# Patient Record
Sex: FEMALE | Race: WHITE | NOT HISPANIC OR LATINO | Employment: FULL TIME | URBAN - METROPOLITAN AREA
[De-identification: names, ages, dates, MRNs, and addresses within clinical notes are randomized per-mention and may not be internally consistent; named-entity substitution may affect disease eponyms.]

---

## 2017-07-05 ENCOUNTER — APPOINTMENT (EMERGENCY)
Dept: RADIOLOGY | Facility: HOSPITAL | Age: 57
End: 2017-07-05
Payer: COMMERCIAL

## 2017-07-05 ENCOUNTER — HOSPITAL ENCOUNTER (EMERGENCY)
Facility: HOSPITAL | Age: 57
Discharge: HOME | End: 2017-07-05
Attending: EMERGENCY MEDICINE | Admitting: EMERGENCY MEDICINE
Payer: COMMERCIAL

## 2017-07-05 VITALS
HEART RATE: 103 BPM | TEMPERATURE: 98.5 F | RESPIRATION RATE: 16 BRPM | OXYGEN SATURATION: 99 % | DIASTOLIC BLOOD PRESSURE: 63 MMHG | SYSTOLIC BLOOD PRESSURE: 158 MMHG

## 2017-07-05 DIAGNOSIS — D64.9 ANEMIA: ICD-10-CM

## 2017-07-05 DIAGNOSIS — Z98.890 HISTORY OF ESOPHAGOGASTRODUODENOSCOPY (EGD): Primary | ICD-10-CM

## 2017-07-05 LAB
ALBUMIN SERPL BCP-MCNC: 3.9 G/DL (ref 3.5–5)
ALP SERPL-CCNC: 47 U/L (ref 46–116)
ALT SERPL W P-5'-P-CCNC: 22 U/L (ref 12–78)
ANION GAP SERPL CALCULATED.3IONS-SCNC: 10 MMOL/L (ref 4–13)
AST SERPL W P-5'-P-CCNC: 17 U/L (ref 5–45)
BASOPHILS # BLD AUTO: 0.02 THOUSANDS/ΜL (ref 0–0.1)
BASOPHILS NFR BLD AUTO: 0 % (ref 0–1)
BILIRUB SERPL-MCNC: 0.4 MG/DL (ref 0.2–1)
BUN SERPL-MCNC: 15 MG/DL (ref 5–25)
CALCIUM SERPL-MCNC: 8.8 MG/DL (ref 8.3–10.1)
CHLORIDE SERPL-SCNC: 102 MMOL/L (ref 100–108)
CO2 SERPL-SCNC: 28 MMOL/L (ref 21–32)
CREAT SERPL-MCNC: 0.63 MG/DL (ref 0.6–1.3)
EOSINOPHIL # BLD AUTO: 0.02 THOUSAND/ΜL (ref 0–0.61)
EOSINOPHIL NFR BLD AUTO: 0 % (ref 0–6)
ERYTHROCYTE [DISTWIDTH] IN BLOOD BY AUTOMATED COUNT: 20.5 % (ref 11.6–15.1)
GFR SERPL CREATININE-BSD FRML MDRD: >60 ML/MIN/1.73SQ M
GLUCOSE SERPL-MCNC: 104 MG/DL (ref 65–140)
HCT VFR BLD AUTO: 31.3 % (ref 34.8–46.1)
HGB BLD-MCNC: 8.8 G/DL (ref 11.5–15.4)
LACTATE SERPL-SCNC: 1.3 MMOL/L (ref 0.5–2)
LYMPHOCYTES # BLD AUTO: 1.12 THOUSANDS/ΜL (ref 0.6–4.47)
LYMPHOCYTES NFR BLD AUTO: 8 % (ref 14–44)
MCH RBC QN AUTO: 18.4 PG (ref 26.8–34.3)
MCHC RBC AUTO-ENTMCNC: 28.1 G/DL (ref 31.4–37.4)
MCV RBC AUTO: 66 FL (ref 82–98)
MONOCYTES # BLD AUTO: 0.78 THOUSAND/ΜL (ref 0.17–1.22)
MONOCYTES NFR BLD AUTO: 6 % (ref 4–12)
NEUTROPHILS # BLD AUTO: 12.37 THOUSANDS/ΜL (ref 1.85–7.62)
NEUTS SEG NFR BLD AUTO: 86 % (ref 43–75)
PLATELET # BLD AUTO: 391 THOUSANDS/UL (ref 149–390)
PMV BLD AUTO: 9.3 FL (ref 8.9–12.7)
POTASSIUM SERPL-SCNC: 3.5 MMOL/L (ref 3.5–5.3)
PROT SERPL-MCNC: 8.3 G/DL (ref 6.4–8.2)
RBC # BLD AUTO: 4.77 MILLION/UL (ref 3.81–5.12)
SODIUM SERPL-SCNC: 140 MMOL/L (ref 136–145)
WBC # BLD AUTO: 14.31 THOUSAND/UL (ref 4.31–10.16)

## 2017-07-05 PROCEDURE — 87040 BLOOD CULTURE FOR BACTERIA: CPT | Performed by: EMERGENCY MEDICINE

## 2017-07-05 PROCEDURE — 96374 THER/PROPH/DIAG INJ IV PUSH: CPT

## 2017-07-05 PROCEDURE — 80053 COMPREHEN METABOLIC PANEL: CPT | Performed by: EMERGENCY MEDICINE

## 2017-07-05 PROCEDURE — 71020 HB CHEST X-RAY 2VW FRONTAL&LATL: CPT

## 2017-07-05 PROCEDURE — 36415 COLL VENOUS BLD VENIPUNCTURE: CPT | Performed by: EMERGENCY MEDICINE

## 2017-07-05 PROCEDURE — 99284 EMERGENCY DEPT VISIT MOD MDM: CPT

## 2017-07-05 PROCEDURE — 85025 COMPLETE CBC W/AUTO DIFF WBC: CPT | Performed by: EMERGENCY MEDICINE

## 2017-07-05 PROCEDURE — 93005 ELECTROCARDIOGRAM TRACING: CPT | Performed by: EMERGENCY MEDICINE

## 2017-07-05 PROCEDURE — 83605 ASSAY OF LACTIC ACID: CPT | Performed by: EMERGENCY MEDICINE

## 2017-07-05 RX ORDER — ONDANSETRON 2 MG/ML
4 INJECTION INTRAMUSCULAR; INTRAVENOUS ONCE
Status: COMPLETED | OUTPATIENT
Start: 2017-07-05 | End: 2017-07-05

## 2017-07-05 RX ORDER — ONDANSETRON 4 MG/1
4 TABLET, ORALLY DISINTEGRATING ORAL EVERY 8 HOURS PRN
Qty: 20 TABLET | Refills: 0 | Status: SHIPPED | OUTPATIENT
Start: 2017-07-05 | End: 2017-07-12

## 2017-07-05 RX ADMIN — ONDANSETRON 4 MG: 2 INJECTION INTRAMUSCULAR; INTRAVENOUS at 21:46

## 2017-07-06 LAB
ATRIAL RATE: 96 BPM
P AXIS: 56 DEGREES
PR INTERVAL: 146 MS
QRS AXIS: -11 DEGREES
QRSD INTERVAL: 88 MS
QT INTERVAL: 348 MS
QTC INTERVAL: 430 MS
T WAVE AXIS: 32 DEGREES
VENTRICULAR RATE: 92 BPM

## 2017-07-11 LAB — BACTERIA BLD CULT: NORMAL

## 2018-09-18 ENCOUNTER — EVALUATION (OUTPATIENT)
Dept: PHYSICAL THERAPY | Age: 58
End: 2018-09-18
Payer: COMMERCIAL

## 2018-09-18 VITALS — SYSTOLIC BLOOD PRESSURE: 142 MMHG | DIASTOLIC BLOOD PRESSURE: 96 MMHG | HEART RATE: 85 BPM

## 2018-09-18 DIAGNOSIS — I89.0 LYMPHEDEMA: Primary | ICD-10-CM

## 2018-09-18 DIAGNOSIS — Z96.652 STATUS POST TOTAL LEFT KNEE REPLACEMENT: ICD-10-CM

## 2018-09-18 PROCEDURE — 97162 PT EVAL MOD COMPLEX 30 MIN: CPT

## 2018-09-18 PROCEDURE — G8981 BODY POS CURRENT STATUS: HCPCS

## 2018-09-18 PROCEDURE — G8982 BODY POS GOAL STATUS: HCPCS

## 2018-09-18 NOTE — LETTER
[id number]    Erin Granger MD  [facility] [address] [place] [address]    Patient: Edd Randall   YOB: 1960   Date of Visit: 2018     Encounter Diagnosis     ICD-10-CM    1  Lymphedema I89 0    2  Status post total left knee replacement O07 196        Dear Dr Esther Manning:    Please review the attached Plan of Care from [facility] recent visit  Please verify that you agree therapy should continue by signing the attached document and sending it back to our office  If you have any questions or concerns, please don't hesitate to call  Sincerely,    Greg Bear, PT      Referring Provider:      I certify that I have read the below Plan of Care and certify the need for these services furnished under this plan of treatment while under my care  Erin Granger MD  [facility] 31: [phone]          PT Evaluation     Today's date: 2018  Patient name: Edd Randall  : 1960  MRN: [id number]  Referring provider: Jose Juan Hong MD  Dx:   Encounter Diagnosis     ICD-10-CM    1  Lymphedema I89 0    2  Status post total left knee replacement Z96 652                   Assessment  Impairments: abnormal gait, abnormal muscle firing, abnormal or restricted ROM, abnormal movement, activity intolerance, impaired physical strength, lacks appropriate home exercise program and pain with function  Other impairment: pt unable to left le independently,   Functional limitations: decreased tolerance to bending, walking, decreased bed mobility  Assessment details: The pt is a 65year old female s/p left tkr performed on 2018 presenting with residual rom, strength, gait , balance and rom deficits in addition to +3 pitting lymphedema of the left le  PT is warranted to address girth reduction in addition to achieving traditional tkr goals of improved rom, strength , bed mobility and gait independence  The pt requires encouragement to engage in self stretching with increase frequency  PT will also trial manual lymphatic drainage massage and soft tissue mobilization, home compression pump usage and pending insurance clearance flexinator usage  Upon wound healing aquatic therapy may be utilized with use of hydrostatic pressure further reducing left le girth and improving lymphatic circulation with continued rom and strength training also to continue  Fit for thigh high compression garment left le ( 4 refills ) 20-30 mmHG  , home compression pump with left le elevation 40 mmHG, lymphedema exer to improve lymphatic circulation  Understanding of Dx/Px/POC: good   Prognosis: good    Goals  stg independent with home exer program in two weeks       Increase left knee flexion to 110 degrees in two weeks  ltg left le mmt to 5/5 status in 5 weeks   Achieve reduction in le girth by 4 cm  Left le in 4-8 weeks   Pain reduction by 50 percent in 4 weeks     Plan  Patient would benefit from: PT eval, lymphedema eval and skilled physical therapy  Referral necessary: Yes  Planned modality interventions: cryotherapy and manual electrical stimulation  Other planned modality interventions: flexinator  pending insurance clearance  Planned therapy interventions: neuromuscular re-education, muscle pump exercises, manual therapy, massage, compression, therapeutic activities, strengthening, stretching, therapeutic exercise, balance, aquatic therapy and home exercise program  Other planned therapy interventions: aquatic PT upon wound healing, home compression pump left le with elevation with 40 mmHG fitting for thigh high compression garment 20-30 MmHG left le  Frequency: 3x week  Duration in weeks: 8  Treatment plan discussed with: patient and family        Subjective Evaluation    History of Present Illness  Onset date: 8/31/2018    Date of surgery: 8/31/2018  Mechanism of injury: The pt is a 65year old female referred to outpt PT s/p 2018 left TKR due to arthritis with immediate onset of left le lymphedema +3 pitting edema noted  She presents with significant rom limitations decreased left le strength, pain with activity , antalgic gait with poor quality and significant left le increased girth  PMH is significant  The pt is unable to actively lift her left le up onto the bed on her own at this time without use of a leg  at this time  Not a recurrent problem   Quality of life: good    Pain  Current pain ratin  At best pain rating: 3  At worst pain ratin  Location: left knee   Quality: pulling, tight, sharp and squeezing  Relieving factors: ice and rest  Aggravating factors: walking (bending and straightening knee)  Progression: worsening    Social Support  Steps to enter house: yes (1  pole present)  Stairs in house: yes (11 steps with left railing)   Lives in: multiple-level home  Lives with: spouse    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 )  Exercise history: sedentary       Treatments  Current treatment: massage and physical therapy  Patient Goals  Patient goals for therapy: increased strength, independence with ADLs/IADLs, return to sport/leisure activities, return to work, increased motion, decreased pain, improved balance and decreased edema  Patient goal: to have less swelling in my left leg and walk better "        Objective     Active Range of Motion   Left Hip   Flexion: 110 degrees   Extension: WFL  Abduction: WFL  Adduction: WFL    Right Hip   Normal active range of motion  Left Knee   Flexion: 70 degrees with pain  Prone flexion: 50 degrees with pain  Extension: Left knee active extension: -20   with pain  Left Ankle/Foot   Dorsiflexion (ke): 10 degrees     Additional Active Range of Motion Details  Goal 20 dorsiflexion arom     Passive Range of Motion   Left Knee   Flexion: 75 degrees with pain  Prone flexion: 60 degrees with pain  Extension: Left knee passive extension: -10  Additional Passive Range of Motion Details  Pt states she never had full knee extension after acl repair surgery 16 years ago    Strength/Myotome Testing     Left Hip   Planes of Motion   Flexion: 3-  Extension: 3+  Abduction: 4  Adduction: 2+    Right Hip   Planes of Motion   Flexion: 5  Extension: 5  Abduction: 5  Adduction: 5    Left Knee   Flexion: 3-  Prone flexion: 3-  Extension: 3-  Quadriceps contraction: poor    Left Ankle/Foot   Dorsiflexion: 5    Additional Strength Details  + left extensor lag,  + patellar restriction noted     Ambulation     Ambulation: Level Surfaces   Ambulation without assistive device: independent    Additional Level Surfaces Ambulation Details  40 ft antalgic, with excessive knee flexion on left, absent heel toe rolloff on left , uneven step length with trunk flexion excessive and excessive wt bearing on st cane,     Ambulation: Stairs   Ascend stairs: independent  Pattern: non-reciprocal  Railings: one rail  Descend stairs: independent  Pattern: non-reciprocal  Railings: one rail      Flowsheet Rows      Most Recent Value   PT/OT G-Codes   Current Score  29   Projected Score  0   Assessment Type  Evaluation   G code set  Changing & Maintaining Body Position   Changing and Maintaining Body Position Current Status ()  CL   Changing and Maintaining Body Position Goal Status ()  CK                    9/18/18 Left  Right  reev left  Right     mtp 23cm 23cm      Lat malleolus 28 25      4cm prox to lat mal 24 22      8 24 5 23      12 27 25      16 30 29      20 33 5 33      24 35 5 35 5      28 36 36      32 37 35 5      36 36 5 34      40 42 35      44 45 37 5      48 45 37      52 45 5 38 5      56 47 41 5cm      60 46 5 43cm                  Precautions:  Allergies to morphine, penicillin, sulfa drugs PMH: HTN, s/p left aCL reconstruction 16 years ago, s/p 8/31/2018 left TKA, arthritis, full thickness rotator cuff sh tear , s/p hernia repair  Hx of vericose veins, saphenous vein left le closed surgically 5 years ago, s/p gastric sleeve surgery    Daily Treatment Diary     Manual  9/18            Prom left knee flexion /ext to tolerance  I eval                                                                    Exercise Diary  9/18            Glut sets, quad sets 20 reps 5 sec hold             Left aarom slr 1 set of 10             90-90 knee flexion stretch 1 set of 5 hold 10 sec            tke  10 reps             sidelying hip abd 1 set of 10            Hip add 1 set of 10            Prone hip ext  10            Prone knee flexion  10 left             Sitting knee flexion prom self  10 reps hold 10 sec             Long sit hamstring stretch 1 set of 5 hold 10 sec             heelcord stretch 1 min            Self patellar mobilization left             heelslides 10            Adductor sets with ball             Gait training with st cane heel toe roll over on left and straighten knee              Flexinator?  If available                                                                     Modalities  9/18            Ice left knee             E stim for quad activation prn

## 2018-09-19 ENCOUNTER — OFFICE VISIT (OUTPATIENT)
Dept: PHYSICAL THERAPY | Age: 58
End: 2018-09-19
Payer: COMMERCIAL

## 2018-09-19 DIAGNOSIS — I89.0 LYMPHEDEMA: Primary | ICD-10-CM

## 2018-09-19 PROCEDURE — 97016 VASOPNEUMATIC DEVICE THERAPY: CPT

## 2018-09-19 PROCEDURE — 97110 THERAPEUTIC EXERCISES: CPT

## 2018-09-19 PROCEDURE — 97140 MANUAL THERAPY 1/> REGIONS: CPT

## 2018-09-19 NOTE — PROGRESS NOTES
Daily Note     Today's date: 2018  Patient name: Sarah Reddy  : 1960  MRN: [id number]  Referring provider: Shun Nation MD  Dx:   Encounter Diagnosis     ICD-10-CM    1  Lymphedema I89 0                   Subjective: Pt  Able to perform supine hip flexion without help end of session  PROM to 90 degrees flexion  Objective: See treatment diary below      Assessment: Tolerated treatment well  Patient would benefit from continued PT      Plan: Continue per plan of care  Precautions: Allergies to morphine, penicillin, sulfa drugs PMH: HTN, s/p left aCL reconstruction 16 years ago, s/p 2018 left TKA, arthritis, full thickness rotator cuff sh tear , s/p hernia repair  Hx of vericose veins, saphenous vein left le closed surgically 5 years ago, s/p gastric sleeve surgery    Daily Treatment Diary     Manual             Prom left knee flexion /ext to tolerance  I eval                                                                    Exercise Diary             Glut sets, quad sets 20 reps 5 sec hold  20           Left aarom slr 1 set of 10  10           90-90 knee flexion stretch 1 set of 5 hold 10 sec 20sec x 5           tke  10 reps  20           sidelying hip abd 1 set of 10 2 x 10           Hip add 1 set of 10 2 x 10           Prone hip ext  10 2 x 10           Prone knee flexion  10 left  20           Sitting knee flexion prom self  10 reps hold 10 sec  10sec x 10           Long sit hamstring stretch 1 set of 5 hold 10 sec  20sec x 5           heelcord stretch 1 min 20sec x 5           Self patellar mobilization left             heelslides 10 10           Adductor sets with ball             Gait training with st cane heel toe roll over on left and straighten knee              Flexinator?  If available                                                                     Modalities             Ice left knee             E stim for quad activation prn Vaso pump  20 min

## 2018-09-19 NOTE — PROGRESS NOTES
PT Evaluation     Today's date: 2018  Patient name: Harvinder Almodovar  : 1960  MRN: [id number]  Referring provider: Victoriano Beard MD  Dx:   Encounter Diagnosis     ICD-10-CM    1  Lymphedema I89 0    2  Status post total left knee replacement Z96 652                   Assessment  Impairments: abnormal gait, abnormal muscle firing, abnormal or restricted ROM, abnormal movement, activity intolerance, impaired physical strength, lacks appropriate home exercise program and pain with function  Other impairment: pt unable to left le independently,   Functional limitations: decreased tolerance to bending, walking, decreased bed mobility  Assessment details: The pt is a 65year old female s/p left tkr performed on 2018 presenting with residual rom, strength, gait , balance and rom deficits in addition to +3 pitting lymphedema of the left le  PT is warranted to address girth reduction in addition to achieving traditional tkr goals of improved rom, strength , bed mobility and gait independence  The pt requires encouragement to engage in self stretching with increase frequency  PT will also trial manual lymphatic drainage massage and soft tissue mobilization, home compression pump usage and pending insurance clearance flexinator usage  Upon wound healing aquatic therapy may be utilized with use of hydrostatic pressure further reducing left le girth and improving lymphatic circulation with continued rom and strength training also to continue  Fit for thigh high compression garment left le ( 4 refills ) 20-30 mmHG  , home compression pump with left le elevation 40 mmHG, lymphedema exer to improve lymphatic circulation     Understanding of Dx/Px/POC: good   Prognosis: good    Goals  stg independent with home exer program in two weeks       Increase left knee flexion to 110 degrees in two weeks  ltg left le mmt to 5/5 status in 5 weeks   Achieve reduction in le girth by 4 cm  Left le in 4-8 weeks   Pain reduction by 50 percent in 4 weeks     Plan  Patient would benefit from: PT eval, lymphedema eval and skilled physical therapy  Referral necessary: Yes  Planned modality interventions: cryotherapy and manual electrical stimulation  Other planned modality interventions: flexinator  pending insurance clearance  Planned therapy interventions: neuromuscular re-education, muscle pump exercises, manual therapy, massage, compression, therapeutic activities, strengthening, stretching, therapeutic exercise, balance, aquatic therapy and home exercise program  Other planned therapy interventions: aquatic PT upon wound healing, home compression pump left le with elevation with 40 mmHG fitting for thigh high compression garment 20-30 MmHG left le  Frequency: 3x week  Duration in weeks: 8  Treatment plan discussed with: patient and family        Subjective Evaluation    History of Present Illness  Onset date: 2018  Date of surgery: 2018  Mechanism of injury: The pt is a 65year old female referred to outpt PT s/p 2018 left TKR due to arthritis with immediate onset of left le lymphedema +3 pitting edema noted  She presents with significant rom limitations decreased left le strength, pain with activity , antalgic gait with poor quality and significant left le increased girth  PMH is significant  The pt is unable to actively lift her left le up onto the bed on her own at this time without use of a leg  at this time     Not a recurrent problem   Quality of life: good    Pain  Current pain ratin  At best pain rating: 3  At worst pain ratin  Location: left knee   Quality: pulling, tight, sharp and squeezing  Relieving factors: ice and rest  Aggravating factors: walking (bending and straightening knee)  Progression: worsening    Social Support  Steps to enter house: yes (1  pole present)  Stairs in house: yes (11 steps with left railing)   Lives in: multiple-level home  Lives with: spouse    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 )  Exercise history: sedentary       Treatments  Current treatment: massage and physical therapy  Patient Goals  Patient goals for therapy: increased strength, independence with ADLs/IADLs, return to sport/leisure activities, return to work, increased motion, decreased pain, improved balance and decreased edema  Patient goal: to have less swelling in my left leg and walk better "        Objective     Active Range of Motion   Left Hip   Flexion: 110 degrees   Extension: WFL  Abduction: WFL  Adduction: WFL    Right Hip   Normal active range of motion  Left Knee   Flexion: 70 degrees with pain  Prone flexion: 50 degrees with pain  Extension: Left knee active extension: -20  with pain  Left Ankle/Foot   Dorsiflexion (ke): 10 degrees     Additional Active Range of Motion Details  Goal 20 dorsiflexion arom     Passive Range of Motion   Left Knee   Flexion: 75 degrees with pain  Prone flexion: 60 degrees with pain  Extension: Left knee passive extension: -10       Additional Passive Range of Motion Details  Pt states she never had full knee extension after acl repair surgery 16 years ago    Strength/Myotome Testing     Left Hip   Planes of Motion   Flexion: 3-  Extension: 3+  Abduction: 4  Adduction: 2+    Right Hip   Planes of Motion   Flexion: 5  Extension: 5  Abduction: 5  Adduction: 5    Left Knee   Flexion: 3-  Prone flexion: 3-  Extension: 3-  Quadriceps contraction: poor    Left Ankle/Foot   Dorsiflexion: 5    Additional Strength Details  + left extensor lag,  + patellar restriction noted     Ambulation     Ambulation: Level Surfaces   Ambulation without assistive device: independent    Additional Level Surfaces Ambulation Details  40 ft antalgic, with excessive knee flexion on left, absent heel toe rolloff on left , uneven step length with trunk flexion excessive and excessive wt bearing on st cane,     Ambulation: Stairs   Ascend stairs: independent  Pattern: non-reciprocal  Railings: one rail  Descend stairs: independent  Pattern: non-reciprocal  Railings: one rail      Flowsheet Rows      Most Recent Value   PT/OT G-Codes   Current Score  29   Projected Score  0   Assessment Type  Evaluation   G code set  Changing & Maintaining Body Position   Changing and Maintaining Body Position Current Status ()  CL   Changing and Maintaining Body Position Goal Status ()  CK                    9/18/18 Left  Right  reev left  Right     mtp 23cm 23cm      Lat malleolus 28 25      4cm prox to lat mal 24 22      8 24 5 23      12 27 25      16 30 29      20 33 5 33      24 35 5 35 5      28 36 36      32 37 35 5      36 36 5 34      40 42 35      44 45 37 5      48 45 37      52 45 5 38 5      56 47 41 5cm      60 46 5 43cm                  Precautions: Allergies to morphine, penicillin, sulfa drugs PMH: HTN, s/p left aCL reconstruction 16 years ago, s/p 8/31/2018 left TKA, arthritis, full thickness rotator cuff sh tear , s/p hernia repair   Hx of vericose veins, saphenous vein left le closed surgically 5 years ago, s/p gastric sleeve surgery    Daily Treatment Diary     Manual  9/18            Prom left knee flexion /ext to tolerance  I eval                                                                    Exercise Diary  9/18            Glut sets, quad sets 20 reps 5 sec hold             Left aarom slr 1 set of 10             90-90 knee flexion stretch 1 set of 5 hold 10 sec            tke  10 reps             sidelying hip abd 1 set of 10            Hip add 1 set of 10            Prone hip ext  10            Prone knee flexion  10 left             Sitting knee flexion prom self  10 reps hold 10 sec             Long sit hamstring stretch 1 set of 5 hold 10 sec             heelcord stretch 1 min            Self patellar mobilization left             heelslides 10            Adductor sets with ball             Gait training with st cane heel toe roll over on left and straighten knee              Flexinator?  If available                                                                     Modalities  9/18            Ice left knee             E stim for quad activation prn

## 2018-09-20 ENCOUNTER — APPOINTMENT (OUTPATIENT)
Dept: PHYSICAL THERAPY | Age: 58
End: 2018-09-20
Payer: COMMERCIAL

## 2018-09-21 ENCOUNTER — OFFICE VISIT (OUTPATIENT)
Dept: PHYSICAL THERAPY | Age: 58
End: 2018-09-21
Payer: COMMERCIAL

## 2018-09-21 DIAGNOSIS — Z96.652 TOTAL KNEE REPLACEMENT STATUS, LEFT: ICD-10-CM

## 2018-09-21 DIAGNOSIS — I89.0 LYMPHEDEMA: Primary | ICD-10-CM

## 2018-09-21 PROCEDURE — 97140 MANUAL THERAPY 1/> REGIONS: CPT

## 2018-09-21 PROCEDURE — 97110 THERAPEUTIC EXERCISES: CPT

## 2018-09-21 PROCEDURE — 97530 THERAPEUTIC ACTIVITIES: CPT

## 2018-09-22 NOTE — PROGRESS NOTES
Daily Note     Today's date: 2018  Patient name: Shay Vick  : 1960  MRN: [id number]  Referring provider: Brooke Smith MD  Dx:   Encounter Diagnosis     ICD-10-CM    1  Lymphedema I89 0    2  Total knee replacement status, left Z96 652                   Subjective: "I am walking better with my st cane now "  Pt reports tape was removed yesterday at Dr  [employer]  Manual                   Prom left knee flexion /ext to tolerance  I eval  man  man                                                                                                                       Exercise Diary                   Glut sets, quad sets 20 reps 5 sec hold  20  20 reps                  Left aarom slr 1 set of 10  10  3x 10 arom                 90-90 knee flexion stretch 1 set of 5 hold 10 sec 20sec x 5  x 5                 tke  10 reps  20  30                 sidelying hip abd 1 set of 10 2 x 10                   Hip add 1 set of 10 2 x 10                   Prone hip ext  10 2 x 10                   Prone knee flexion  10 left  20                   Sitting knee flexion prom self  10 reps hold 10 sec  10sec x 10  x 10 10 sec hold                 Long sit hamstring stretch 1 set of 5 hold 10 sec  20sec x 5  x 5 hold 20 sec                 heelcord stretch 1 min 20sec x 5                   Self patellar mobilization left                       heelslides 10 10  20                 Adductor sets with ball                       Gait training with st cane heel toe roll over on left and straighten knee                        Flexinator?  If available                        lifecycle  10 min       1/2 ranges fwd,backwd                                                                                               Modalities                   Ice left knee                       E stim for quad activation prn                       Vaso pump 40 mmHG left le elevated   20 min  30 min                                   Objective: See treatment diary below      Assessment: Tolerated treatment well  Patient would benefit from continued PT      Plan: Progress treatment as tolerated

## 2018-09-24 ENCOUNTER — OFFICE VISIT (OUTPATIENT)
Dept: PHYSICAL THERAPY | Age: 58
End: 2018-09-24
Payer: COMMERCIAL

## 2018-09-24 DIAGNOSIS — Z96.652 TOTAL KNEE REPLACEMENT STATUS, LEFT: Primary | ICD-10-CM

## 2018-09-24 PROCEDURE — 97140 MANUAL THERAPY 1/> REGIONS: CPT

## 2018-09-24 PROCEDURE — 97016 VASOPNEUMATIC DEVICE THERAPY: CPT

## 2018-09-24 PROCEDURE — 97110 THERAPEUTIC EXERCISES: CPT

## 2018-09-24 NOTE — PROGRESS NOTES
Daily Note     Today's date: 2018  Patient name: Roxana Hannah  : 1960  MRN: [id number]  Referring provider: Adwoa Rene MD  Dx:   Encounter Diagnosis     ICD-10-CM    1  Total knee replacement status, left Z96 652                   Subjective: Pt  Using flexionator device today  Objective: See treatment diary below      Assessment: Tolerated treatment well  Patient would benefit from continued PT      Plan: Continue per plan of care  Subjective: "I am walking better with my st cane now "  Pt reports tape was removed yesterday at Dr Chantale William  Manual                 Prom left knee flexion /ext to tolerance  I eval  man  man  10min                                                                                                                     Exercise Diary                 Glut sets, quad sets 20 reps 5 sec hold  20  20 reps   20               Left aarom slr 1 set of 10  10  3x 10 arom                 90-90 knee flexion stretch 1 set of 5 hold 10 sec 20sec x 5  x 5                 tke  10 reps  20  30  30               sidelying hip abd 1 set of 10 2 x 10                   Hip add 1 set of 10 2 x 10                   Prone hip ext  10 2 x 10                   Prone knee flexion  10 left  20                   Sitting knee flexion prom self  10 reps hold 10 sec  10sec x 10  x 10 10 sec hold                 Long sit hamstring stretch 1 set of 5 hold 10 sec  20sec x 5  x 5 hold 20 sec                 heelcord stretch 1 min 20sec x 5                   Self patellar mobilization left                       heelslides 10 10  20                 Adductor sets with ball      20                 Gait training with st cane heel toe roll over on left and straighten knee                        Flexinator?  If available                        lifecycle  10 min       1/2 ranges fwd,backwd                  flexionator      10 min                                                                       Modalities  9/18 9/19 9/21 9/24               Ice left knee                       E stim for quad activation prn                       Vaso pump 40 mmHG left le elevated   20 min  30 min  20 min

## 2018-09-26 ENCOUNTER — OFFICE VISIT (OUTPATIENT)
Dept: PHYSICAL THERAPY | Age: 58
End: 2018-09-26
Payer: COMMERCIAL

## 2018-09-26 DIAGNOSIS — Z96.652 TOTAL KNEE REPLACEMENT STATUS, LEFT: Primary | ICD-10-CM

## 2018-09-26 PROCEDURE — 97110 THERAPEUTIC EXERCISES: CPT

## 2018-09-26 PROCEDURE — 97140 MANUAL THERAPY 1/> REGIONS: CPT

## 2018-09-26 NOTE — PROGRESS NOTES
Daily Note     Today's date: 2018  Patient name: Jovanny Yarbrough  : 1960  MRN: [id number]  Referring provider: Krystian Denis MD  Dx:   Encounter Diagnosis     ICD-10-CM    1  Total knee replacement status, left Z96 652                   Subjective: Progressing flexion and extension  Difficulty with SLR flexion  Objective: See treatment diary below      Assessment: Tolerated treatment well  Patient would benefit from continued PT      Plan: Continue per plan of care  Pt reports tape was removed yesterday at Dr Dugan Cargo  Manual               Prom left knee flexion /ext to tolerance  I eval  man  man  10min  15 min                                                                                                                   Exercise Diary               Glut sets, quad sets 20 reps 5 sec hold  20  20 reps   20  20             Left aarom slr 1 set of 10  10  3x 10 arom                 90-90 knee flexion stretch 1 set of 5 hold 10 sec 20sec x 5  x 5    20sec x 5             tke  10 reps  20  30  30  30             sidelying hip abd 1 set of 10 2 x 10      20             Hip add 1 set of 10 2 x 10                   Prone hip ext  10 2 x 10                   Prone knee flexion  10 left  20                   Sitting knee flexion prom self  10 reps hold 10 sec  10sec x 10  x 10 10 sec hold                 Long sit hamstring stretch 1 set of 5 hold 10 sec  20sec x 5  x 5 hold 20 sec  20sec x 5               heelcord stretch 1 min 20sec x 5    20sec x 5               Self patellar mobilization left                       heelslides 10 10  20                 Adductor sets with ball      20                 Gait training with st cane heel toe roll over on left and straighten knee                        Flexinator?  If available                        lifecycle  10 min       1/2 ranges fwd,backwd  10 min                flexionator      10 min                                                                       Modalities  9/18 9/19 9/21 9/24               Ice left knee                       E stim for quad activation prn                       Vaso pump 40 mmHG left le elevated   20 min  30 min  20 min

## 2018-09-27 ENCOUNTER — OFFICE VISIT (OUTPATIENT)
Dept: PHYSICAL THERAPY | Age: 58
End: 2018-09-27
Payer: COMMERCIAL

## 2018-09-27 DIAGNOSIS — Z96.652 TOTAL KNEE REPLACEMENT STATUS, LEFT: Primary | ICD-10-CM

## 2018-09-27 PROCEDURE — 97140 MANUAL THERAPY 1/> REGIONS: CPT

## 2018-09-27 PROCEDURE — 97110 THERAPEUTIC EXERCISES: CPT

## 2018-09-27 PROCEDURE — 97530 THERAPEUTIC ACTIVITIES: CPT

## 2018-09-27 PROCEDURE — 97112 NEUROMUSCULAR REEDUCATION: CPT

## 2018-09-27 NOTE — PROGRESS NOTES
Daily Note     Today's date: 2018  Patient name: Roxana Hannah  : 1960  MRN: [id number]  Referring provider: Adwoa Rene MD  Dx:   Encounter Diagnosis     ICD-10-CM    1   Total knee replacement status, left Z96 652                   Subjective: "I am very stiff today "      Objective: See treatment diary below  Manual           Prom left knee flexion /ext to tolerance  I eval  man  man  10min  15 min    15 min man          direct myofascial release left quad, hamstring and calf region  Left               man                                                                                       Exercise Diary             Glut sets, quad sets 20 reps 5 sec hold  20  20 reps   20  20  30 reps           Left aarom slr 1 set of 10  10  3x 10 arom      arom 2 x 10           90-90 knee flexion stretch 1 set of 5 hold 10 sec 20sec x 5  x 5    20sec x 5  2 sets of 5 hold 10 sec each direction           tke  10 reps  20  30  30  30  3 x 10           sidelying hip abd 1 set of 10 2 x 10      20             Hip add 1 set of 10 2 x 10                   Prone hip ext  10 2 x 10                   Prone knee flexion  10 left  20                   Sitting knee flexion prom self  Also prone quad stretch with strap 1 min x 2 10 reps hold 10 sec  10sec x 10  x 10 10 sec hold      perf           Long sit hamstring stretch 1 set of 5 hold 10 sec  20sec x 5  x 5 hold 20 sec  20sec x 5               heelcord stretch 1 min 20sec x 5    20sec x 5               Self patellar mobilization left                       heelslides 10 10  20                 Adductor sets with ball      20                 Gait training with st cane heel toe roll over on left and straighten knee           cueing              heelslides            20 reps               lifecycle  10 min       1/2 ranges fwd,backwd  10 min  upright bike 5 min full rotation              flexionator      10 min    15 min              nu step            5min                                           Modalities  9/18 9/19 9/21 9/24               Ice left knee                       E stim for quad activation prn                       Vaso pump 40 mmHG left le elevated   20 min  30 min  20 min                         Assessment: Tolerated treatment well  Patient would benefit from continued PT  Pt with improved knee flexion with myofascial release techniques  Pt better with active stretching versus passive stretching  Encourage on flexinator aarom with knee flexion  Plan: Progress treatment as tolerated

## 2018-09-28 ENCOUNTER — APPOINTMENT (OUTPATIENT)
Dept: PHYSICAL THERAPY | Age: 58
End: 2018-09-28
Payer: COMMERCIAL

## 2018-10-01 ENCOUNTER — OFFICE VISIT (OUTPATIENT)
Dept: PHYSICAL THERAPY | Age: 58
End: 2018-10-01
Payer: COMMERCIAL

## 2018-10-01 DIAGNOSIS — Z96.652 TOTAL KNEE REPLACEMENT STATUS, LEFT: Primary | ICD-10-CM

## 2018-10-01 PROCEDURE — 97110 THERAPEUTIC EXERCISES: CPT

## 2018-10-01 NOTE — PROGRESS NOTES
Daily Note     Today's date: 10/1/2018  Patient name: Mikey Lay  : 1960  MRN: [id number]  Referring provider: Toribio Sinha MD  Dx:   Encounter Diagnosis     ICD-10-CM    1  Total knee replacement status, left Z96 652                   Subjective: Pt  With 90 degrees flexion AROM left knee  Objective: See treatment diary below      Assessment: Tolerated treatment well  Patient would benefit from continued PT      Plan: Continue per plan of care         Objective: See treatment diary below  Manual    101       Prom left knee flexion /ext to tolerance  I eval  man  man  10min  15 min    15 min man  15 min        direct myofascial release left quad, hamstring and calf region  Left               man                                                                                       Exercise Diary    10         Glut sets, quad sets 20 reps 5 sec hold  20  20 reps   20  20  30 reps  30         Left aarom slr 1 set of 10  10  3x 10 arom      arom 2 x 10  20         90-90 knee flexion stretch 1 set of 5 hold 10 sec 20sec x 5  x 5    20sec x 5  2 sets of 5 hold 10 sec each direction  2# 20         tke  10 reps  20  30  30  30  3 x 10  30         sidelying hip abd 1 set of 10 2 x 10      20    30         Hip add 1 set of 10 2 x 10          30         Prone hip ext  10 2 x 10          30         Prone knee flexion  10 left  20          30         Sitting knee flexion prom self  Also prone quad stretch with strap 1 min x 2 10 reps hold 10 sec  10sec x 10  x 10 10 sec hold      perf  30         Long sit hamstring stretch 1 set of 5 hold 10 sec  20sec x 5  x 5 hold 20 sec  20sec x 5      20sec x 5         heelcord stretch 1 min 20sec x 5    20sec x 5      20sec x 5         Self patellar mobilization left                       heelslides 10 10  20        20         Adductor sets with ball      20        30         Gait training with st cane heel toe roll over on left and straighten knee           cueing              heelslides            20 reps               lifecycle  10 min       1/2 ranges fwd,backwd  10 min  upright bike 5 min full rotation              flexionator      10 min    15 min             High bike           5min  15 min  15 min                                       Modalities  9/18 9/19 9/21 9/24               Ice left knee                       E stim for quad activation prn                       Vaso pump 40 mmHG left le elevated   20 min  30 min  20 min

## 2018-10-03 ENCOUNTER — OFFICE VISIT (OUTPATIENT)
Dept: PHYSICAL THERAPY | Age: 58
End: 2018-10-03
Payer: COMMERCIAL

## 2018-10-03 DIAGNOSIS — Z96.652 TOTAL KNEE REPLACEMENT STATUS, LEFT: Primary | ICD-10-CM

## 2018-10-03 PROCEDURE — 97110 THERAPEUTIC EXERCISES: CPT

## 2018-10-03 PROCEDURE — G0283 ELEC STIM OTHER THAN WOUND: HCPCS

## 2018-10-03 PROCEDURE — 97014 ELECTRIC STIMULATION THERAPY: CPT

## 2018-10-03 NOTE — PROGRESS NOTES
Daily Note     Today's date: 10/3/2018  Patient name: Cliff Gregorio  : 1960  MRN: [id number]  Referring provider: Wayne Britton MD  Dx:   Encounter Diagnosis     ICD-10-CM    1  Total knee replacement status, left Z96 652                   Subjective: Pt  Comes to therapy c/o stiffness limiting all movement  Able to improve AROM with activity  [facility]-stim today at [facility] throughout session  Objective: See treatment diary below      Assessment: Tolerated treatment well  Patient would benefit from continued PT        Plan: Continue per plan of care           Objective: See treatment diary below  Manual  9/18 9/19  9/21  9/24  9/26    9/27  10/1  10/3     Prom left knee flexion /ext to tolerance  I eval  man  man  10min  15 min    15 min man  15 min        direct myofascial release left quad, hamstring and calf region  Left               man                                                                                       Exercise Diary  9/18 9/19  9/21  9/24  9/26  9/27  10/1  10/3       Glut sets, quad sets 20 reps 5 sec hold  20  20 reps   20  20  30 reps  30  30       Left aarom slr 1 set of 10  10  3x 10 arom      arom 2 x 10  20  20       90-90 knee flexion stretch 1 set of 5 hold 10 sec 20sec x 5  x 5    20sec x 5  2 sets of 5 hold 10 sec each direction  2# 20  10       tke  10 reps  20  30  30  30  3 x 10  30  30       sidelying hip abd 1 set of 10 2 x 10      20    30  20       Hip add 1 set of 10 2 x 10          30  20       Prone hip ext  10 2 x 10          30  20       Prone knee flexion  10 left  20          30  20       Sitting knee flexion prom self  Also prone quad stretch with strap 1 min x 2 10 reps hold 10 sec  10sec x 10  x 10 10 sec hold      perf  30  30       Long sit hamstring stretch 1 set of 5 hold 10 sec  20sec x 5  x 5 hold 20 sec  20sec x 5      20sec x 5  20sec x 5       heelcord stretch 1 min 20sec x 5    20sec x 5      20sec x 5  20sec x 5       Self patellar mobilization left                HEP       heelslides 10 10  20        20  20       Adductor sets with ball      20        30         Gait training with st cane heel toe roll over on left and straighten knee           cueing              heelslides            20 reps               lifecycle  10 min       1/2 ranges fwd,backwd  10 min  upright bike 5 min full rotation              flexionator      10 min    15 min             High bike           5min  15 min  15 min                                       Modalities  9/18 9/19  9/21  9/24  10/3             Ice left knee                       E stim for quad activation prn          yes             Vaso pump 40 mmHG left le elevated   20 min  30 min  20 min

## 2018-10-05 ENCOUNTER — OFFICE VISIT (OUTPATIENT)
Dept: PHYSICAL THERAPY | Age: 58
End: 2018-10-05
Payer: COMMERCIAL

## 2018-10-05 DIAGNOSIS — Z96.652 TOTAL KNEE REPLACEMENT STATUS, LEFT: Primary | ICD-10-CM

## 2018-10-05 PROCEDURE — 97110 THERAPEUTIC EXERCISES: CPT

## 2018-10-05 PROCEDURE — 97140 MANUAL THERAPY 1/> REGIONS: CPT

## 2018-10-06 NOTE — PROGRESS NOTES
Daily Note     Today's date: 10/5/2018  Patient name: Ramon Garcia  : 1960  MRN: [id number]  Referring provider: Dar Goldberg MD  Dx:   Encounter Diagnosis     ICD-10-CM    1   Total knee replacement status, left Z96 652                   Subjective:  "My left leg gets so stiff, I don't know why "    Mcnulty Matt Due supervising 12-1215pm PT      Objective: See treatment diary below    Manual  9/18 9/19  9/21  9/24  9/26    9/27  10/1  10/3  10/5   Prom left knee flexion /ext to tolerance  I eval  man  man  10min  15 min    15 min man  15 min    man     direct myofascial release left quad, hamstring and calf region  Left               man      man                                                                                 Exercise Diary  9/18 9/19  9/21  9/24  9/26  9/27  10/1  10/3  10/5     Glut sets, quad sets 20 reps 5 sec hold  20  20 reps   20  20  30 reps  30  30       Left aarom slr 1 set of 10  10  3x 10 arom      arom 2 x 10  20  20       90-90 knee flexion stretch 1 set of 5 hold 10 sec 20sec x 5  x 5    20sec x 5  2 sets of 5 hold 10 sec each direction  2# 20  10  10     tke  10 reps  20  30  30  30  3 x 10  30  30       sidelying hip abd 1 set of 10 2 x 10      20    30  20       Hip add 1 set of 10 2 x 10          30  20       Prone hip ext  10 2 x 10          30  20       Prone knee flexion  10 left  20          30  20  30     Sitting knee flexion prom self  Also prone quad stretch with strap 1 min x 2 10 reps hold 10 sec  10sec x 10  x 10 10 sec hold      perf  30  30       Long sit hamstring stretch 1 set of 5 hold 10 sec  20sec x 5  x 5 hold 20 sec  20sec x 5      20sec x 5  20sec x 5  extensinator x 10 min      heelcord stretch 1 min 20sec x 5    20sec x 5      20sec x 5  20sec x 5       Self patellar mobilization left                HEP  man     heelslides 10 10  20        20  20       Adductor sets with ball      20        30         Gait training with st cane heel toe roll over on left and straighten knee           cueing              heelslides            20 reps               lifecycle  10 min       1/2 ranges fwd,backwd  10 min  upright bike 5 min full rotation              flexionator      10 min    15 min        15 min      High bike           5min  15 min  15 min                                       Modalities  9/18 9/19  9/21  9/24  10/3             Ice left knee                       E stim for quad activation prn          yes             Vaso pump 40 mmHG left le elevated   20 min  30 min  20 min                                   Assessment: Tolerated treatmen well  Patient would benefit from continued PT  Pt trialing extensinator this session and will utilize over the weekend for extension  95 prone and supine knee flexion left achieved today  Pt to see Dr William Richmond Thursday   Good tolerance to direct myofascial release improved gait quality at end of treatment session today  Plan: Continue per plan of care

## 2018-10-08 ENCOUNTER — OFFICE VISIT (OUTPATIENT)
Dept: PHYSICAL THERAPY | Age: 58
End: 2018-10-08
Payer: COMMERCIAL

## 2018-10-08 DIAGNOSIS — I89.0 LYMPHEDEMA: ICD-10-CM

## 2018-10-08 DIAGNOSIS — Z96.652 HISTORY OF TOTAL LEFT KNEE REPLACEMENT (TKR): Primary | ICD-10-CM

## 2018-10-08 PROCEDURE — 97140 MANUAL THERAPY 1/> REGIONS: CPT

## 2018-10-08 PROCEDURE — 97110 THERAPEUTIC EXERCISES: CPT

## 2018-10-08 NOTE — PROGRESS NOTES
Daily Note     Today's date: 10/8/2018  Patient name: Lali Doss  : 1960  MRN: [id number]  Referring provider: Sage Condon MD  Dx:   Encounter Diagnosis     ICD-10-CM    1  History of total left knee replacement (TKR) Z96 652    2  Lymphedema I89 0                   Subjective: "I am very sore this morning, I can barely bend it " Pt observed with poor gait quality this am with antalgic positioning         Objective: See treatment diary below   Manual therapy by PT 15 min       Exercise Diary  9/18 9/19  9/21  9/24  9/26  9/27  10/1  10/3  10/5  10/8   Glut sets, quad sets 20 reps 5 sec hold  20  20 reps   20  20  30 reps  30  30    30 reps    Left aarom slr 1 set of 10  10  3x 10 arom      arom 2 x 10  20  20    arom 2 x 10   90-90 knee flexion stretch 1 set of 5 hold 10 sec 20sec x 5  x 5    20sec x 5  2 sets of 5 hold 10 sec each direction  2# 20  10  10     tke  10 reps  20  30  30  30  3 x 10  30  30    3 x 10   sidelying hip abd 1 set of 10 2 x 10      20    30  20       Hip add 1 set of 10 2 x 10          30  20       Prone hip ext  10 2 x 10          30  20    20 reps    Prone knee flexion  10 left  20          30  20  30     Sitting knee flexion prom self  Also prone quad stretch with strap 1 min x 2 10 reps hold 10 sec  10sec x 10  x 10 10 sec hold      perf  30  30    with strap 20 reps 10 sec hold then 5 min static hold    Long sit hamstring stretch 1 set of 5 hold 10 sec  20sec x 5  x 5 hold 20 sec  20sec x 5      20sec x 5  20sec x 5  extensinator x 10 min      heelcord stretch 1 min 20sec x 5    20sec x 5      20sec x 5  20sec x 5       Self patellar mobilization left                HEP  man  man and fascial release   heelslides 10 10  20        20  20       Adductor sets with ball      20        30         Gait training with st cane heel toe roll over on left and straighten knee           cueing              heelslides            20 reps               lifecycle  10 min       1/2 ranges fwd,backwd  10 min  upright bike 5 min full rotation          upright bike 10 min     flexionator      10 min    15 min        15 min      High bike           5min  15 min  15 min                                       Modalities  9/18 9/19  9/21  9/24  10/3  10/8           Ice left knee                       E stim for quad activation prn          yes  15 min            Vaso pump 40 mmHG left le elevated   20 min  30 min  20 min                               Assessment: Tolerated treatment well  Patient would benefit from continued PT      Plan: Continue per plan of care

## 2018-10-10 ENCOUNTER — OFFICE VISIT (OUTPATIENT)
Dept: PHYSICAL THERAPY | Age: 58
End: 2018-10-10
Payer: COMMERCIAL

## 2018-10-10 DIAGNOSIS — Z96.652 HISTORY OF TOTAL LEFT KNEE REPLACEMENT (TKR): Primary | ICD-10-CM

## 2018-10-10 PROCEDURE — 97140 MANUAL THERAPY 1/> REGIONS: CPT

## 2018-10-10 PROCEDURE — G8982 BODY POS GOAL STATUS: HCPCS

## 2018-10-10 PROCEDURE — 97110 THERAPEUTIC EXERCISES: CPT

## 2018-10-10 PROCEDURE — 97112 NEUROMUSCULAR REEDUCATION: CPT

## 2018-10-10 PROCEDURE — G8981 BODY POS CURRENT STATUS: HCPCS

## 2018-10-10 PROCEDURE — 97750 PHYSICAL PERFORMANCE TEST: CPT

## 2018-10-10 NOTE — LETTER
[id number]    Alveda Sandifer, MD [facility] [place] [address]    Patient: David Morgan   YOB: 1960   Date of Visit: 10/10/2018     Encounter Diagnosis     ICD-10-CM    1  History of total left knee replacement (TKR) Y44 317        Dear Dr Garcia Press:    Please review the attached Plan of Care from [facility] recent visit  Please verify that you agree therapy should continue by signing the attached document and sending it back to our office  If you have any questions or concerns, please don't hesitate to call  Sincerely,    María Wright, PT      Referring Provider:      I certify that I have read the below Plan of Care and certify the need for these services furnished under this plan of treatment while under my care  Alveda Sandifer, MD  [facility]: [phone]          PT Re-Evaluation     Today's date: 10/10/2018  Patient name: David Morgan  : 1960  MRN: [id number]  Referring provider: Vahid Arevalo MD  Dx:   Encounter Diagnosis     ICD-10-CM    1  History of total left knee replacement (TKR) [id number]                   Assessment  Impairments: abnormal gait, abnormal muscle firing, abnormal or restricted ROM, activity intolerance, impaired physical strength and pain with function  Other impairment: myofascial restriction, decreased hamstring and quad muscle recruitment with single limb exer, improved with bilateral le activation,   Functional limitations: decreased bending, squatting, decreased tolerance to prolonged standing  Assessment details: The pt has made slow but steady progress with left le increased left knee flexion in sitting to 95 arom 110 prom, prone 95 arom, 102 prom, extension - 8 degrees prom noted  Recommend to continue PT    Recommend left le thigh high compression knee high 20-30 MMHG ( 4 refills )Flexinator device not covered by insurance unable to obtain  The pt has benefited from compression therapy for edema reduction, in addition to direct myofascial release techniques with improved gait quality with st cane noted today less antalgic gait with improved left hip and knee flexion noted and heel toe roll off  Recommend to continue PT at this time  Home compression pump with left le elevation 40 mmHG recommended and compression wrapping to improve lymphatic drainage  + extensor lag left le noted but improving  Interestingly the pt presents with decreased hamstring muscle activation on the left which improves dramatically with bilateral knee flexion activation with overflow phenomenon   NMES being utilized in the dept  Recommend home nmes unit at this time to increase muscle activation  Today the pt did achieve reciprocal stair climbing and full bicycle revolutions with improved range of motion and left le girth reduction  Understanding of Dx/Px/POC: good   Prognosis: good    Goals  stg HEP in two weeks met  Achieve left le 100 degrees knee flexion ( in sitting 110 degrees met - prone 102 degrees ,   Ext -8 degrees prom )  ltg  Increased left le mmt 1/2 muscle grade met in 4 weeks   Pt achieving non antalgic gait, partially met with st cane in 4 weeks    Plan  Patient would benefit from: PT eval and skilled physical therapy  Other planned modality interventions: home compression pump left le elevation 40 mmHG, nmes for home use for neuromuscular michael  Planned therapy interventions: manual therapy, compression, neuromuscular re-education, muscle pump exercises, strengthening, stretching, therapeutic activities, therapeutic exercise, home exercise program and gait training  Other planned therapy interventions: direct myofascial release techniques    Frequency: 3x week  Duration in weeks: 8  Treatment plan discussed with: patient        Subjective Evaluation    History of Present Illness  Mechanism of injury: See ieval   Pt making steady progress with left le knee flexion, ext  rom and strength  Recommend to continue PT  Left le thigh high compression garment recommended 20-30 mmHG  Not a recurrent problem   Quality of life: good    Pain  Current pain rating: 3  At best pain ratin  At worst pain ratin  Location: left knee   Quality: pressure and sharp  Relieving factors: ice and rest  Aggravating factors: walking and standing  Progression: improved    Treatments  Current treatment: physical therapy  Patient Goals  Patient goals for therapy: decreased edema, decreased pain, increased motion, increased strength, independence with ADLs/IADLs and return to sport/leisure activities  Patient goal: improve range of motion , decrease left le edema and reduce pain           Objective     Active Range of Motion   Left Hip   Flexion: WFL  Extension: WFL  Abduction: WFL  Adduction: WFL  Left Knee   Flexion: 100 (in sitting,  95 in prone arom) degrees   Prone flexion: 95 degrees with pain  Extension: -8 degrees   Extensor lag: -8 degrees   Left Ankle/Foot   Dorsiflexion (ke): 20 degrees     Passive Range of Motion   Left Knee   Flexion: 110 degrees   Prone flexion: 102 degrees   Extension: -5 degrees     Strength/Myotome Testing     Left Hip   Planes of Motion   Flexion: 4+  Extension: 5  Abduction: 5  Adduction: 4-    Left Knee   Prone flexion: 4-  Extension: 4-    Additional Strength Details  + ext lag    Ambulation     Ambulation: Level Surfaces   Ambulation with assistive device: independent    Additional Level Surfaces Ambulation Details  St cane slight antalgic gait 500 ft improved    Ambulation: Stairs   Ascend stairs: independent  Pattern: reciprocal  Railings: one rail  Descend stairs: independent  Pattern: reciprocal  Railings: one rail      Flowsheet Rows      Most Recent Value   PT/OT G-Codes   Current Score  68 2   FOTO information reviewed  Yes   Assessment Type  Re-evaluation   G code set  Changing & Maintaining Body Position   Changing and Maintaining Body Position Current Status ()  CK   Changing and Maintaining Body Position Goal Status ()  CJ          Precautions: see med allergies    Daily Treatment Diary     Manual  10/10            Prom left knee flex and ext prone , sitting man             reassessment of status perf testing             Direct myofascial release techniques prn                                           Exercise Diary  10/10            Supine 90-90 stretch 10 sec holds in flex and ext 10 reps             heelslides with strap to stretch left le  10 reps             tke  3 x 10            Quad sets,  30 reps             slr left le x 3 3 x 10            Sitting laq  Left  3 x 10             Bicycle 1/2 revolutions 10 min  Able to perform 10 full revolutions            Nu step             Gt training with st cane                                                                                                                                                                Modalities  10/10            nmes left quad prn

## 2018-10-11 NOTE — PROGRESS NOTES
PT Re-Evaluation     Today's date: 10/10/2018  Patient name: Sharon Alexander  : 1960  MRN: [id number]  Referring provider: Kaia Hui MD  Dx:   Encounter Diagnosis     ICD-10-CM    1  History of total left knee replacement (TKR) [id number]                   Assessment  Impairments: abnormal gait, abnormal muscle firing, abnormal or restricted ROM, activity intolerance, impaired physical strength and pain with function  Other impairment: myofascial restriction, decreased hamstring and quad muscle recruitment with single limb exer, improved with bilateral le activation,   Functional limitations: decreased bending, squatting, decreased tolerance to prolonged standing  Assessment details: The pt has made slow but steady progress with left le increased left knee flexion in sitting to 95 arom 110 prom, prone 95 arom, 102 prom, extension - 8 degrees prom noted  Recommend to continue PT   Recommend left le thigh high compression knee high 20-30 MMHG ( 4 refills )Flexinator device not covered by insurance unable to obtain  The pt has benefited from compression therapy for edema reduction, in addition to direct myofascial release techniques with improved gait quality with st cane noted today less antalgic gait with improved left hip and knee flexion noted and heel toe roll off  Recommend to continue PT at this time  Home compression pump with left le elevation 40 mmHG recommended and compression wrapping to improve lymphatic drainage  + extensor lag left le noted but improving  Interestingly the pt presents with decreased hamstring muscle activation on the left which improves dramatically with bilateral knee flexion activation with overflow phenomenon   NMES being utilized in the dept  Recommend home nmes unit at this time to increase muscle activation  Today the pt did achieve reciprocal stair climbing and full bicycle revolutions with improved range of motion and left le girth reduction     Understanding of Dx/Px/POC: good   Prognosis: good    Goals  stg HEP in two weeks met  Achieve left le 100 degrees knee flexion ( in sitting 110 degrees met - prone 102 degrees ,   Ext -8 degrees prom )  ltg  Increased left le mmt 1/2 muscle grade met in 4 weeks   Pt achieving non antalgic gait, partially met with st cane in 4 weeks    Plan  Patient would benefit from: PT eval and skilled physical therapy  Other planned modality interventions: home compression pump left le elevation 40 mmHG, nmes for home use for neuromuscular michael  Planned therapy interventions: manual therapy, compression, neuromuscular re-education, muscle pump exercises, strengthening, stretching, therapeutic activities, therapeutic exercise, home exercise program and gait training  Other planned therapy interventions: direct myofascial release techniques  Frequency: 3x week  Duration in weeks: 8  Treatment plan discussed with: patient        Subjective Evaluation    History of Present Illness  Mechanism of injury: See ieval   Pt making steady progress with left le knee flexion, ext  rom and strength  Recommend to continue PT  Left le thigh high compression garment recommended 20-30 mmHG  Not a recurrent problem   Quality of life: good    Pain  Current pain rating: 3  At best pain ratin  At worst pain ratin  Location: left knee   Quality: pressure and sharp  Relieving factors: ice and rest  Aggravating factors: walking and standing  Progression: improved    Treatments  Current treatment: physical therapy  Patient Goals  Patient goals for therapy: decreased edema, decreased pain, increased motion, increased strength, independence with ADLs/IADLs and return to sport/leisure activities  Patient goal: improve range of motion , decrease left le edema and reduce pain           Objective     Active Range of Motion   Left Hip   Flexion: WFL  Extension: WFL  Abduction: WFL  Adduction: WFL  Left Knee   Flexion: 100 (in sitting,  95 in prone arom) degrees Prone flexion: 95 degrees with pain  Extension: -8 degrees   Extensor lag: -8 degrees   Left Ankle/Foot   Dorsiflexion (ke): 20 degrees     Passive Range of Motion   Left Knee   Flexion: 110 degrees   Prone flexion: 102 degrees   Extension: -5 degrees     Strength/Myotome Testing     Left Hip   Planes of Motion   Flexion: 4+  Extension: 5  Abduction: 5  Adduction: 4-    Left Knee   Prone flexion: 4-  Extension: 4-    Additional Strength Details  + ext lag    Ambulation     Ambulation: Level Surfaces   Ambulation with assistive device: independent    Additional Level Surfaces Ambulation Details  St cane slight antalgic gait 500 ft improved    Ambulation: Stairs   Ascend stairs: independent  Pattern: reciprocal  Railings: one rail  Descend stairs: independent  Pattern: reciprocal  Railings: one rail      Flowsheet Rows      Most Recent Value   PT/OT G-Codes   Current Score  68 2   FOTO information reviewed  Yes   Assessment Type  Re-evaluation   G code set  Changing & Maintaining Body Position   Changing and Maintaining Body Position Current Status ()  CK   Changing and Maintaining Body Position Goal Status ()  CJ          Precautions: see med allergies    Daily Treatment Diary     Manual  10/10            Prom left knee flex and ext prone , sitting man             reassessment of status perf testing             Direct myofascial release techniques prn                                           Exercise Diary  10/10            Supine 90-90 stretch 10 sec holds in flex and ext 10 reps             heelslides with strap to stretch left le  10 reps             tke  3 x 10            Quad sets,  30 reps             slr left le x 3 3 x 10            Sitting laq  Left  3 x 10             Bicycle 1/2 revolutions 10 min  Able to perform 10 full revolutions            Nu step             Gt training with st cane Modalities  10/10            nmes left quad prn

## 2018-10-12 ENCOUNTER — OFFICE VISIT (OUTPATIENT)
Dept: PHYSICAL THERAPY | Age: 58
End: 2018-10-12
Payer: COMMERCIAL

## 2018-10-12 DIAGNOSIS — Z96.652 S/P TKR (TOTAL KNEE REPLACEMENT), LEFT: Primary | ICD-10-CM

## 2018-10-12 PROCEDURE — 97112 NEUROMUSCULAR REEDUCATION: CPT

## 2018-10-12 PROCEDURE — 97110 THERAPEUTIC EXERCISES: CPT

## 2018-10-12 PROCEDURE — 97140 MANUAL THERAPY 1/> REGIONS: CPT

## 2018-10-12 NOTE — PROGRESS NOTES
Daily Note     Today's date: 10/12/2018  Patient name: Sherri Ackerman  : 1960  MRN: [id number]  Referring provider: Star Peterson MD  Dx:   Encounter Diagnosis     ICD-10-CM    1  S/P TKR (total knee replacement), left Z96 652                   Subjective: "I have to get manipulated per Dr Abril Carrasco next Wednesday "      Objective: See treatment diary below       Manual  10/10  10/12                   Prom left knee flex and ext prone , sitting man  man                     reassessment of status perf testing                      Direct myofascial release techniques prn   man                                                                         Exercise Diary  10/10  10/12                   Supine 90-90 stretch 10 sec holds in flex and ext 10 reps   10                   heelslides with strap to stretch left le  10 reps   10                   tke  3 x 10  3 x 10                   Quad sets,  30 reps   30 reps                    slr left le x 3 3 x 10  3 x 10                   Sitting laq  Left  3 x 10   3 x10                   Bicycle 1/2 revolutions 10 min  Able to perform 10 full revolutions  10 min full revol                   Nu step    10 min                   Gt training with st cane                                                                                                                                                                                                                                                                                                     Modalities  10/10                     nmes left quad prn                                                                               Assessment: Tolerated treatment well  Patient would benefit from continued PT      Plan: Continue per plan of care  Pt to call   Have questions answered about manipulation

## 2018-10-15 ENCOUNTER — OFFICE VISIT (OUTPATIENT)
Dept: PHYSICAL THERAPY | Age: 58
End: 2018-10-15
Payer: COMMERCIAL

## 2018-10-15 DIAGNOSIS — Z96.652 HISTORY OF TOTAL LEFT KNEE REPLACEMENT (TKR): Primary | ICD-10-CM

## 2018-10-15 DIAGNOSIS — I89.0 LYMPHEDEMA: ICD-10-CM

## 2018-10-15 PROCEDURE — 97112 NEUROMUSCULAR REEDUCATION: CPT

## 2018-10-15 PROCEDURE — 97110 THERAPEUTIC EXERCISES: CPT

## 2018-10-15 PROCEDURE — 97140 MANUAL THERAPY 1/> REGIONS: CPT

## 2018-10-16 NOTE — PROGRESS NOTES
Daily Note     Today's date: 10/15/2018  Patient name: Cliff Gregorio  : 1960  MRN: [id number]  Referring provider: Wayne Britton MD  Dx:   Encounter Diagnosis     ICD-10-CM    1  History of total left knee replacement (TKR) Z96 652    2  Lymphedema I89 0                   Subjective: "I am having a manipulation Wed "      Objective: See treatment diary below     Manual  10/10  10/12  10/15                 Prom left knee flex and ext prone , sitting man  man  man                   reassessment of status perf testing                      Direct myofascial release techniques prn   man                                                                         Exercise Diary  10/10  10/12  10/15                 Supine 90-90 stretch 10 sec holds in flex and ext 10 reps   10  10                 heelslides with strap to stretch left le  10 reps   10  20                 tke  3 x 10  3 x 10  3 x10                 Quad sets,  30 reps   30 reps                    slr left le x 3 3 x 10  3 x 10  3 x10                 Sitting laq  Left  3 x 10   3 x10  3x10                 Bicycle 1/2 revolutions 10 min  Able to perform 10 full revolutions  10 min full revol  10 min full rev                 Nu step    10 min  10 min                 Gt training with st cane                                                                                                                                                                                                                                                                                                     Modalities  10/10                     nmes left quad prn                                                                                 Assessment: Tolerated treatment well  Patient demonstrated fatigue post treatment and would benefit from continued PT pt to have maniupulation this Wed  Plan: Continue per plan of care

## 2018-10-17 ENCOUNTER — APPOINTMENT (OUTPATIENT)
Dept: PHYSICAL THERAPY | Age: 58
End: 2018-10-17
Payer: COMMERCIAL

## 2018-10-17 ENCOUNTER — LAB REQUISITION (OUTPATIENT)
Dept: LAB | Facility: HOSPITAL | Age: 58
End: 2018-10-17
Payer: COMMERCIAL

## 2018-10-17 DIAGNOSIS — Z96.652 PRESENCE OF LEFT ARTIFICIAL KNEE JOINT: ICD-10-CM

## 2018-10-17 DIAGNOSIS — Z47.1 AFTERCARE FOLLOWING JOINT REPLACEMENT SURGERY: ICD-10-CM

## 2018-10-17 PROCEDURE — 87205 SMEAR GRAM STAIN: CPT | Performed by: ORTHOPAEDIC SURGERY

## 2018-10-17 PROCEDURE — 87075 CULTR BACTERIA EXCEPT BLOOD: CPT | Performed by: ORTHOPAEDIC SURGERY

## 2018-10-17 PROCEDURE — 87070 CULTURE OTHR SPECIMN AEROBIC: CPT | Performed by: ORTHOPAEDIC SURGERY

## 2018-10-18 ENCOUNTER — OFFICE VISIT (OUTPATIENT)
Dept: PHYSICAL THERAPY | Age: 58
End: 2018-10-18
Payer: COMMERCIAL

## 2018-10-18 DIAGNOSIS — Z96.652 HISTORY OF TOTAL LEFT KNEE REPLACEMENT (TKR): Primary | ICD-10-CM

## 2018-10-18 PROCEDURE — 97014 ELECTRIC STIMULATION THERAPY: CPT

## 2018-10-18 PROCEDURE — 97110 THERAPEUTIC EXERCISES: CPT

## 2018-10-18 PROCEDURE — 97112 NEUROMUSCULAR REEDUCATION: CPT

## 2018-10-18 PROCEDURE — 97140 MANUAL THERAPY 1/> REGIONS: CPT

## 2018-10-18 PROCEDURE — G0283 ELEC STIM OTHER THAN WOUND: HCPCS

## 2018-10-18 NOTE — PROGRESS NOTES
Daily Note     Today's date: 10/18/2018  Patient name: Indigo Gonzalez  : 1960  MRN: [id number]  Referring provider: Jorge Machuca MD  Dx:   Encounter Diagnosis     ICD-10-CM    1  History of total left knee replacement (TKR) Z96 652        Start Time: 0800  Stop Time: 0900  Total time in clinic (min): 60 minutes    Subjective: Pt  Returns to therapy after left knee manipulation  Increased flexion and extension  Able to make full revolution forward on recumbent bike today   degrees in supine  Objective: See treatment diary below      Assessment: Tolerated treatment well  Patient would benefit from continued PT      Plan: Continue per plan of care           Manual  10/10  10/12  10/15  10/18               Prom left knee flex and ext prone , sitting man  man  man  man                 reassessment of status perf testing                      Direct myofascial release techniques prn   man                                                                         Exercise Diary  10/10  10/12  10/15  10/18               Supine 90-90 stretch 10 sec holds in flex and ext 10 reps   10  10  10 reps               heelslides with strap to stretch left le  10 reps   10  20  20               tke  3 x 10  3 x 10  3 x10                 Quad sets,  30 reps   30 reps                    slr left le x 3 3 x 10  3 x 10  3 x10                 Sitting laq  Left  3 x 10   3 x10  3x10                 Bicycle  10 min  Able to perform 10 full revolutions  10 min full revol  10 min full rev  12 min               Nu step    10 min  10 min                 Gt training with st cane                                                                                                                                                                                                                                                                                                     Modalities  10/10        10/18             nmes left quad prn          15 min

## 2018-10-19 ENCOUNTER — OFFICE VISIT (OUTPATIENT)
Dept: PHYSICAL THERAPY | Age: 58
End: 2018-10-19
Payer: COMMERCIAL

## 2018-10-19 DIAGNOSIS — Z96.652 HISTORY OF TOTAL LEFT KNEE REPLACEMENT (TKR): Primary | ICD-10-CM

## 2018-10-19 PROCEDURE — 97110 THERAPEUTIC EXERCISES: CPT

## 2018-10-19 PROCEDURE — 97140 MANUAL THERAPY 1/> REGIONS: CPT

## 2018-10-19 PROCEDURE — 97530 THERAPEUTIC ACTIVITIES: CPT

## 2018-10-19 PROCEDURE — 97032 APPL MODALITY 1+ESTIM EA 15: CPT

## 2018-10-19 NOTE — PROGRESS NOTES
Daily Note     Today's date: 10/19/2018  Patient name: Indigo Gonzalez  : 1960  MRN: [id number]  Referring provider: Jorge Machuca MD  Dx:   Encounter Diagnosis     ICD-10-CM    1  History of total left knee replacement (TKR) Z96 652                 Subjective: Emma Simmons supervising 1/2 hour PT      Objective: See treatment diary below     Manual  10/10  10/12  10/15  10/18  10/19             Prom left knee flex and ext prone , sitting man  man  man  man  15 min man               reassessment of status perf testing                      Direct myofascial release techniques prn   man                                                                         Exercise Diary  10/10  10/12  10/15  10/18  10/19             Supine 90-90 stretch 10 sec holds in flex and ext 10 reps   10  10  10 reps  10             heelslides with strap to stretch left le  10 reps   10  20  20  20             tke  3 x 10  3 x 10  3 x10    3 x 10             Quad sets,  30 reps   30 reps                    slr left le x 3 3 x 10  3 x 10  3 x10                 Sitting laq  Left  3 x 10   3 x10  3x10    3 x 10             Bicycle  10 min  Able to perform 10 full revolutions  10 min full revol  10 min full rev  12 min  10 min             Nu step    10 min  10 min    10 min              Gt training with st cane                                                                                                                                                                                                                                                                                                     Modalities  10/10        10/18  10/19           nmes left quad prn          15 min  15 min                                                                            Assessment: Tolerated treatment well  Patient would benefit from continued PT  Pt issued home e stim nmes unit today and instructed in its usage        Plan: Progress treatment as tolerated

## 2018-10-20 LAB
BACTERIA SPEC ANAEROBE CULT: NO GROWTH
BACTERIA SPEC BFLD CULT: NO GROWTH
GRAM STN SPEC: NORMAL

## 2018-10-22 ENCOUNTER — OFFICE VISIT (OUTPATIENT)
Dept: PHYSICAL THERAPY | Age: 58
End: 2018-10-22
Payer: COMMERCIAL

## 2018-10-22 DIAGNOSIS — Z96.652 HISTORY OF TOTAL LEFT KNEE REPLACEMENT (TKR): Primary | ICD-10-CM

## 2018-10-22 PROCEDURE — 97110 THERAPEUTIC EXERCISES: CPT

## 2018-10-22 PROCEDURE — 97014 ELECTRIC STIMULATION THERAPY: CPT

## 2018-10-22 PROCEDURE — G0283 ELEC STIM OTHER THAN WOUND: HCPCS

## 2018-10-22 NOTE — PROGRESS NOTES
Daily Note     Today's date: 10/22/2018  Patient name: Shahrzad Horan  : 1960  MRN: [id number]  Referring provider: Sherri Davidson MD  Dx:   Encounter Diagnosis     ICD-10-CM    1  History of total left knee replacement (TKR) [id number]                   Subjective: Pt  Reports difficulty using home NMES  Objective: See treatment diary below      Assessment: Tolerated treatment well  Patient would benefit from continued PT      Plan: Continue per plan of care         Manual  10/10  10/12  10/15  10/18  10/19  10/22           Prom left knee flex and ext prone , sitting man  man  man  man  15 min man               reassessment of status perf testing                      Direct myofascial release techniques prn   man                                                                         Exercise Diary  10/10  10/12  10/15  10/18  10/19  10/22           Supine 90-90 stretch 10 sec holds in flex and ext 10 reps   10  10  10 reps  10  10           heelslides with strap to stretch left le  10 reps   10  20  20  20  20           tke  3 x 10  3 x 10  3 x10    3 x 10  30           Quad sets,  30 reps   30 reps         20           slr left le x 3 3 x 10  3 x 10  3 x10      10           Sitting laq  Left  3 x 10   3 x10  3x10    3 x 10             Bicycle  10 min  Able to perform 10 full revolutions  10 min full revol  10 min full rev  12 min  10 min  10 min           Nu step    10 min  10 min    10 min   10 min           Gt training with st cane                                                                                                                                                                                                                                                                                                     Modalities  10/10        10/18  10/19           nmes left quad prn          15 min  15 min                                                                      
2017

## 2018-10-24 ENCOUNTER — OFFICE VISIT (OUTPATIENT)
Dept: PHYSICAL THERAPY | Age: 58
End: 2018-10-24
Payer: COMMERCIAL

## 2018-10-24 DIAGNOSIS — I89.0 LYMPHEDEMA: Primary | ICD-10-CM

## 2018-10-24 PROCEDURE — 97140 MANUAL THERAPY 1/> REGIONS: CPT

## 2018-10-24 PROCEDURE — 97110 THERAPEUTIC EXERCISES: CPT

## 2018-10-24 NOTE — PROGRESS NOTES
Daily Note     Today's date: 10/24/2018  Patient name: Fabián Records  : 1960  MRN: [id number]  Referring provider: Mandy Meyer MD  Dx:   Encounter Diagnosis     ICD-10-CM    1  Lymphedema I89 0                   Subjective: Pt  Left knee flexion AAROM 115 degrees today in prone  Contract relax effective with manual stretch  Objective: See treatment diary below      Assessment: Tolerated treatment well  Patient would benefit from continued PT      Plan: Continue per plan of care         Manual  10/10  10/12  10/15  10/18  10/19  10/22  10/24         Prom left knee flex and ext prone , sitting man  man  man  man  15 min man    15 min           reassessment of status perf testing                      Direct myofascial release techniques prn   man                                                                         Exercise Diary  10/10  10/12  10/15  10/18  10/19  10/22  10/24         Supine 90-90 stretch 10 sec holds in flex and ext 10 reps   10  10  10 reps  10  10  10         heelslides with strap to stretch left le  10 reps   10  20  20  20  20  20         tke  3 x 10  3 x 10  3 x10    3 x 10  30  20         Quad sets,  30 reps   30 reps         20           slr left le x 3 3 x 10  3 x 10  3 x10      10           Sitting laq  Left  3 x 10   3 x10  3x10    3 x 10             Bicycle  10 min  Able to perform 10 full revolutions  10 min full revol  10 min full rev  12 min  10 min  10 min  10 min         Nu step    10 min  10 min    10 min   10 min  10 min         Gt training with st cane                                                                                                                                                                                                                                                                                                     Modalities  10/10        10/18  10/19           nmes left quad prn          15 min  15 min

## 2018-10-25 ENCOUNTER — OFFICE VISIT (OUTPATIENT)
Dept: PHYSICAL THERAPY | Age: 58
End: 2018-10-25
Payer: COMMERCIAL

## 2018-10-25 DIAGNOSIS — I89.0 LYMPHEDEMA: Primary | ICD-10-CM

## 2018-10-25 PROCEDURE — 97014 ELECTRIC STIMULATION THERAPY: CPT

## 2018-10-25 PROCEDURE — 97140 MANUAL THERAPY 1/> REGIONS: CPT

## 2018-10-25 PROCEDURE — G0283 ELEC STIM OTHER THAN WOUND: HCPCS

## 2018-10-25 PROCEDURE — 97110 THERAPEUTIC EXERCISES: CPT

## 2018-10-25 NOTE — PROGRESS NOTES
Daily Note     Today's date: 10/25/2018  Patient name: Daily Paula  : 1960  MRN: [id number]  Referring provider: Carlin Cowden, MD  Dx:   Encounter Diagnosis     ICD-10-CM    1  Lymphedema I89 0                   Subjective: Pt  With improved gait pattern and AAROM prone left knee flexion  Objective: See treatment diary below      Assessment: Tolerated treatment well  Patient would benefit from continued PT      Plan: Continue per plan of care

## 2018-10-29 ENCOUNTER — OFFICE VISIT (OUTPATIENT)
Dept: PHYSICAL THERAPY | Age: 58
End: 2018-10-29
Payer: COMMERCIAL

## 2018-10-29 DIAGNOSIS — I89.0 LYMPHEDEMA: Primary | ICD-10-CM

## 2018-10-29 PROCEDURE — 97140 MANUAL THERAPY 1/> REGIONS: CPT

## 2018-10-29 PROCEDURE — 97110 THERAPEUTIC EXERCISES: CPT

## 2018-10-29 NOTE — PROGRESS NOTES
Daily Note     Today's date: 10/29/2018  Patient name: Marija Pascal  : 1960  MRN: [id number]  Referring provider: Aron Way MD  Dx:   Encounter Diagnosis     ICD-10-CM    1  Lymphedema I89 0                   Subjective: Pt  Continues to c/o knee tightness limiting her AROM post session  Trial Graston left ITB to address tightness and "catching"  Objective: See treatment diary below      Assessment: Tolerated treatment well  Patient would benefit from continued PT      Plan: Continue per plan of care           Manual  10/10  10/12  10/15  10/18  10/19  10/22  10/24  10/29       Prom left knee flex and ext prone , sitting man  man  man  man  15 min man    15 min           reassessment of status perf testing                      Direct myofascial release techniques prn   man            15 min        graston                                                     Exercise Diary  10/10  10/12  10/15  10/18  10/19  10/22  10/24  10/29       Supine 90-90 stretch 10 sec holds in flex and ext 10 reps   10  10  10 reps  10  10  10         heelslides with strap to stretch left le  10 reps   10  20  20  20  20  20  20       tke  3 x 10  3 x 10  3 x10    3 x 10  30  20  20       Quad sets,  30 reps   30 reps         20    20       slr left le x 3 3 x 10  3 x 10  3 x10      10           Sitting laq  Left  3 x 10   3 x10  3x10    3 x 10             Bicycle  10 min  Able to perform 10 full revolutions  10 min full revol  10 min full rev  12 min  10 min  10 min  10 min  10 min       Nu step    10 min  10 min    10 min   10 min  10 min  10 min       Gt training with st cane                                                                                                                                                                                                                                                                                                     Modalities  10/10        10/18  10/19           nmes left quad prn          15 min  15 min

## 2018-11-01 ENCOUNTER — OFFICE VISIT (OUTPATIENT)
Dept: PHYSICAL THERAPY | Age: 58
End: 2018-11-01
Payer: COMMERCIAL

## 2018-11-01 DIAGNOSIS — I89.0 LYMPHEDEMA: Primary | ICD-10-CM

## 2018-11-01 PROCEDURE — 97140 MANUAL THERAPY 1/> REGIONS: CPT

## 2018-11-01 PROCEDURE — 97110 THERAPEUTIC EXERCISES: CPT

## 2018-11-01 NOTE — PROGRESS NOTES
Daily Note     Today's date: 2018  Patient name: Bri Arguelles  : 1960  MRN: [id number]  Referring provider: Valentina Fraga MD  Dx:   Encounter Diagnosis     ICD-10-CM    1  Lymphedema I89 0                   Subjective: Md is pleased with progress though more ROM and strengthening needed  Objective: See treatment diary below      Assessment: Tolerated treatment well  Patient would benefit from continued PT      Plan: Continue per plan of care           Manual  10/10  10/12  10/15  10/18  10/19  10/22  10/24  10/29  11/1     Prom left knee flex and ext prone , sitting man  man  man  man  15 min man    15 min           reassessment of status perf testing                      Direct myofascial release techniques prn   man            15 min        graston                  20min                                   Exercise Diary  10/10  10/12  10/15  10/18  10/19  10/22  10/24  10/29  11/1     Supine 90-90 stretch 10 sec holds in flex and ext 10 reps   10  10  10 reps  10  10  10    10     heelslides with strap to stretch left le  10 reps   10  20  20  20  20  20  20  10     tke  3 x 10  3 x 10  3 x10    3 x 10  30  20  20  10     Quad sets,  30 reps   30 reps         20    20  10     slr left le x 3 3 x 10  3 x 10  3 x10      10           Sitting laq  Left  3 x 10   3 x10  3x10    3 x 10             Bicycle  10 min  Able to perform 10 full revolutions  10 min full revol  10 min full rev  12 min  10 min  10 min  10 min  10 min  10 min     Nu step    10 min  10 min    10 min   10 min  10 min  10 min  10min     Gt training with st cane                                                                                                                                                                                                                                                                                                     Modalities  10/10        10/18  10/19           nmes left quad prn          15 min  15 min

## 2018-11-05 ENCOUNTER — OFFICE VISIT (OUTPATIENT)
Dept: PHYSICAL THERAPY | Age: 58
End: 2018-11-05
Payer: COMMERCIAL

## 2018-11-05 DIAGNOSIS — I89.0 LYMPHEDEMA: Primary | ICD-10-CM

## 2018-11-05 PROCEDURE — 97140 MANUAL THERAPY 1/> REGIONS: CPT

## 2018-11-05 PROCEDURE — 97110 THERAPEUTIC EXERCISES: CPT

## 2018-11-05 NOTE — PROGRESS NOTES
Daily Note     Today's date: 2018  Patient name: Angelito Wang  : 1960  MRN: [id number]  Referring provider: Marylee Gentry, MD  Dx:   Encounter Diagnosis     ICD-10-CM    1  Lymphedema I89 0                   Subjective: Pt  Is using community pool for therapy  Tightness continues though Graston is helpful  Objective: See treatment diary below      Assessment: Tolerated treatment well  Patient would benefit from continued PT      Plan: Continue per plan of care           Manual  10/10  10/12  10/15  10/18  10/19  10/22  10/24  10/29  11/1  11   Prom left knee flex and ext prone , sitting man  man  man  man  15 min man    15 min      15min     reassessment of status perf testing                      Direct myofascial release techniques prn   man            15 min        graston                  20min                                   Exercise Diary  10/10  10/12  10/15  10/18  10/19  10/22  10/24  10/29  11/1  11   Supine 90-90 stretch 10 sec holds in flex and ext 10 reps   10  10  10 reps  10  10  10    10  10   heelslides with strap to stretch left le  10 reps   10  20  20  20  20  20  20  10  10   tke  3 x 10  3 x 10  3 x10    3 x 10  30  20  20  10  10   Quad sets,  30 reps   30 reps         20    20  10  20   slr left le x 3 3 x 10  3 x 10  3 x10      10        30   Sitting laq  Left  3 x 10   3 x10  3x10    3 x 10          30   Bicycle  10 min  Able to perform 10 full revolutions  10 min full revol  10 min full rev  12 min  10 min  10 min  10 min  10 min  10 min  10 min   Nu step    10 min  10 min    10 min   10 min  10 min  10 min  10min  10 min   Gt training with st cane                                                                                                                                                                                                                                                                                                     Modalities  10/10        10/18  10/19           nmes left quad prn          15 min  15 min

## 2018-11-07 ENCOUNTER — OFFICE VISIT (OUTPATIENT)
Dept: PHYSICAL THERAPY | Age: 58
End: 2018-11-07
Payer: COMMERCIAL

## 2018-11-07 DIAGNOSIS — I89.0 LYMPHEDEMA: Primary | ICD-10-CM

## 2018-11-07 PROCEDURE — 97110 THERAPEUTIC EXERCISES: CPT

## 2018-11-07 PROCEDURE — 97140 MANUAL THERAPY 1/> REGIONS: CPT

## 2018-11-07 NOTE — PROGRESS NOTES
Daily Note     Today's date: 2018  Patient name: Daily Paula  : 1960  MRN: [id number]  Referring provider: Carlin Cowden, MD  Dx:   Encounter Diagnosis     ICD-10-CM    1  Lymphedema I89 0                   Subjective: Pt  Continues with left LE swelling  Difficult and painful past 90degrees flexion  Objective: See treatment diary below      Assessment: Tolerated treatment well  Patient would benefit from continued PT      Plan: Continue per plan of care           Manual  11/7  10/12  10/15  10/18  10/19  10/22  10/24  10/29  11/1  11   Prom left knee flex and ext prone , sitting man  man  man  man  15 min man    15 min      15min     reassessment of status perf testing                      Direct myofascial release techniques prn   man            15 min        graston                  20min                                   Exercise Diary  11/7  10/12  10/15  10/18  10/19  10/22  10/24  10/29  11/1  11/5   Supine 90-90 stretch 10 sec holds in flex and ext 10 reps   10  10  10 reps  10  10  10    10  10   heelslides with strap to stretch left le  10 reps   10  20  20  20  20  20  20  10  10   tke  3 x 10  3 x 10  3 x10    3 x 10  30  20  20  10  10   Quad sets,  30 reps   30 reps         20    20  10  20   slr left le x 3 3 x 10  3 x 10  3 x10      10        30   Sitting laq  Left  3 x 10   3 x10  3x10    3 x 10          30   Bicycle  10 min  Able to perform 10 full revolutions  10 min full revol  10 min full rev  12 min  10 min  10 min  10 min  10 min  10 min  10 min   Nu step  10 min  10 min  10 min    10 min   10 min  10 min  10 min  10min  10 min   Gt training with st cane                                                                                                                                                                                                                                                                                                     Modalities  10/10        10/18  10/19           nmes left quad prn          15 min  15 min

## 2018-11-08 ENCOUNTER — OFFICE VISIT (OUTPATIENT)
Dept: PHYSICAL THERAPY | Age: 58
End: 2018-11-08
Payer: COMMERCIAL

## 2018-11-08 DIAGNOSIS — Z96.652 HISTORY OF TOTAL LEFT KNEE REPLACEMENT (TKR): Primary | ICD-10-CM

## 2018-11-08 PROCEDURE — 97110 THERAPEUTIC EXERCISES: CPT

## 2018-11-08 PROCEDURE — 97140 MANUAL THERAPY 1/> REGIONS: CPT

## 2018-11-09 NOTE — PROGRESS NOTES
Daily Note     Today's date: 2018  Patient name: Lena Jeter  : 1960  MRN: [id number]  Referring provider: Rosalina Chamberlain MD  Dx:   Encounter Diagnosis     ICD-10-CM    1  History of total left knee replacement (TKR) [id number]                 Subjective: "I don't know why my leg gets so tight "      Objective: See treatment diary below       Manual                       Prom left knee flexion /ext to tolerance  man                      myofascial release left quad and hamstring  man                      kinesiotaping left distal ITB  man                                                                           Exercise Diary                        quad sets   30 reps                      Left arom slr 1 set of 10                      90-90 knee flexion stretch 1 set of 5 hold 10 sec                     tke  10 reps                      sidelying hip abd                       Hip add                       Prone hip ext                       Prone knee flexion  10 left  3x                     Sitting knee flexion prom self  10 reps hold 10 sec                      Long sit hamstring stretch 1 set of 5 hold 10 sec                      heelcord stretch 1 min                     Self patellar mobilization left  man                     heelslides 10                     Adductor sets with ball                       cybex leg ext  10lb x 3                      hamstring curls  30 lb 3 x 10                      hip abd  30lb 3 x 10                     Leg press seat 5  50lb 3 x 10                                                                           Modalities                       Ice left knee  p ext 10 min                     E stim for quad activation prn  at home                                                         Assessment: Tolerated treatment well  Patient would benefit from continued PT, Kinesiotaping left distal ITBand for relief at fibula    Pt to reduce stair climbing at home as excessive load may be increasing left knee pain at night  Plan: Continue per plan of care

## 2018-11-12 ENCOUNTER — OFFICE VISIT (OUTPATIENT)
Dept: PHYSICAL THERAPY | Age: 58
End: 2018-11-12
Payer: COMMERCIAL

## 2018-11-12 DIAGNOSIS — Z96.652 HISTORY OF TOTAL LEFT KNEE REPLACEMENT (TKR): Primary | ICD-10-CM

## 2018-11-12 PROCEDURE — 97113 AQUATIC THERAPY/EXERCISES: CPT

## 2018-11-12 PROCEDURE — G8981 BODY POS CURRENT STATUS: HCPCS

## 2018-11-12 PROCEDURE — G8982 BODY POS GOAL STATUS: HCPCS

## 2018-11-12 NOTE — PROGRESS NOTES
Daily Note     Today's date: 2018  Patient name: Heidy Taylor  : 1960  MRN: [id number]  Referring provider: Braxton Powell MD  Dx:   Encounter Diagnosis     ICD-10-CM    1  History of total left knee replacement (TKR) [id number]                   Subjective: Pt  With fatigue after first pool session  Objective: See treatment diary below      Assessment: Tolerated treatment well  Patient would benefit from continued PT      Plan: Continue per plan of care         Manual                       Prom left knee flexion /ext to tolerance  man                      myofascial release left quad and hamstring  man                      kinesiotaping left distal ITB  man                                                                           Exercise Diary                      quad sets   30 reps   POOL                   Left arom slr 1 set of 10   HSS 20sec x 5                   90-90 knee flexion stretch 1 set of 5 hold 10 sec  hip flex x 30                   tke  10 reps   LAQ x 30                   sidelying hip abd    SLR x 30                   Hip add    HS curl x 30                   Prone hip ext    squats x 20                   Prone knee flexion  10 left  3x  bicycle 5 min                   Sitting knee flexion prom self  10 reps hold 10 sec                      Long sit hamstring stretch 1 set of 5 hold 10 sec                      heelcord stretch 1 min                     Self patellar mobilization left  man                     heelslides 10                     Adductor sets with ball                       cybex leg ext  10lb x 3                      hamstring curls  30 lb 3 x 10                      hip abd  30lb 3 x 10                     Leg press seat 5  50lb 3 x 10                                                                           Modalities                       Ice left knee  p ext 10 min                     E stim for quad activation prn  at home

## 2018-11-16 ENCOUNTER — APPOINTMENT (OUTPATIENT)
Dept: PHYSICAL THERAPY | Age: 58
End: 2018-11-16
Payer: COMMERCIAL

## 2018-11-28 ENCOUNTER — APPOINTMENT (OUTPATIENT)
Dept: PHYSICAL THERAPY | Age: 58
End: 2018-11-28
Payer: COMMERCIAL

## 2018-11-29 ENCOUNTER — OFFICE VISIT (OUTPATIENT)
Dept: PHYSICAL THERAPY | Age: 58
End: 2018-11-29
Payer: COMMERCIAL

## 2018-11-29 DIAGNOSIS — Z96.652 HISTORY OF TOTAL LEFT KNEE REPLACEMENT (TKR): Primary | ICD-10-CM

## 2018-11-29 PROCEDURE — 97140 MANUAL THERAPY 1/> REGIONS: CPT

## 2018-11-29 PROCEDURE — 97110 THERAPEUTIC EXERCISES: CPT

## 2018-11-29 NOTE — PROGRESS NOTES
Daily Note     Today's date: 2018  Patient name: Alli Khan  : 1960  MRN: [id number]  Referring provider: Shanel Saldaña MD  Dx:   Encounter Diagnosis     ICD-10-CM    1  History of total left knee replacement (TKR) [id number]                   Subjective: Pt  Limited by pain PROM right knee flexion  Objective: See treatment diary below      Assessment: Tolerated treatment well  Patient would benefit from continued PT      Plan: Continue per plan of care         Manual                   Prom left knee flexion /ext to tolerance  man    man                  myofascial release left quad and hamstring  man                      kinesiotaping left distal ITB  man    yes                  Graston                                                     Exercise Diary                    quad sets   30 reps   POOL                   Left arom slr 1 set of 10   HSS 20sec x 5                   90-90 knee flexion stretch 1 set of 5 hold 10 sec  hip flex x 30                   tke  10 reps   LAQ x 30                   sidelying hip abd    SLR x 30                   Hip add    HS curl x 30                   Prone hip ext    squats x 20                   Prone knee flexion  10 left  3x  bicycle 5 min  5 min                 Sitting knee flexion prom self  10 reps hold 10 sec     1020sec x 5                 Long sit hamstring stretch 1 set of 5 hold 10 sec     20sec x 5                 heelcord stretch 1 min                     Self patellar mobilization left  man    yes                 heelslides 10                     Adductor sets with ball                       cybex leg ext  10lb x 3                      hamstring curls  30 lb 3 x 10                      hip abd  30lb 3 x 10                     Leg press seat 5  50lb 3 x 10                                                                           Modalities                       Ice left knee  p ext 10 min                     E stim for quad activation prn  at home